# Patient Record
Sex: FEMALE | Race: WHITE | Employment: FULL TIME | URBAN - METROPOLITAN AREA
[De-identification: names, ages, dates, MRNs, and addresses within clinical notes are randomized per-mention and may not be internally consistent; named-entity substitution may affect disease eponyms.]

---

## 2019-05-10 ENCOUNTER — HOSPITAL ENCOUNTER (OUTPATIENT)
Age: 59
Discharge: HOME | End: 2019-05-10
Attending: FAMILY MEDICINE
Payer: COMMERCIAL

## 2019-05-10 VITALS
OXYGEN SATURATION: 98 % | RESPIRATION RATE: 16 BRPM | TEMPERATURE: 98 F | SYSTOLIC BLOOD PRESSURE: 139 MMHG | WEIGHT: 148 LBS | DIASTOLIC BLOOD PRESSURE: 92 MMHG | HEART RATE: 82 BPM

## 2019-05-10 DIAGNOSIS — J01.90 ACUTE SINUSITIS, RECURRENCE NOT SPECIFIED, UNSPECIFIED LOCATION: Primary | ICD-10-CM

## 2019-05-10 PROCEDURE — 99204 OFFICE O/P NEW MOD 45 MIN: CPT

## 2019-05-10 PROCEDURE — 99203 OFFICE O/P NEW LOW 30 MIN: CPT

## 2019-05-10 RX ORDER — PREDNISONE 20 MG/1
40 TABLET ORAL DAILY
Qty: 10 TABLET | Refills: 0 | Status: SHIPPED | OUTPATIENT
Start: 2019-05-10 | End: 2019-05-15

## 2019-05-10 RX ORDER — BUPROPION HYDROCHLORIDE 100 MG/1
100 TABLET ORAL 2 TIMES DAILY
COMMUNITY

## 2019-05-10 RX ORDER — AMOXICILLIN AND CLAVULANATE POTASSIUM 875; 125 MG/1; MG/1
1 TABLET, FILM COATED ORAL 2 TIMES DAILY
Qty: 20 TABLET | Refills: 0 | Status: SHIPPED | OUTPATIENT
Start: 2019-05-10 | End: 2019-05-20

## 2019-05-10 RX ORDER — ALPRAZOLAM 0.25 MG/1
0.25 TABLET ORAL NIGHTLY PRN
COMMUNITY

## 2019-05-10 RX ORDER — LEVOTHYROXINE SODIUM 0.07 MG/1
75 TABLET ORAL
COMMUNITY

## 2019-05-11 NOTE — ED INITIAL ASSESSMENT (HPI)
Patient c/o intermittent post nasal drip for 2 months. Worse this week. Pain behind eyes, headache, and coughing up green sputum for 1 week.

## 2019-05-11 NOTE — ED PROVIDER NOTES
Patient presents with:  Sinusitis  Post Nasal Drip  Headache  Cough    HPI:     Eleno Serrano is a 61year old female who presents with a chief complaint of sinus pain/pressure, facial pain, frontal headache, nasal congestion, thick colored nasal drainag moderate frontal sinus tenderness bilateral  Throat: lips, mucosa, and tongue normal; teeth and gums normal  Neck: no adenopathy and supple, symmetrical, trachea midline  Back: symmetric, no curvature. ROM normal. No CVA tenderness.   Lungs: clear to auscul

## 2019-05-23 ENCOUNTER — APPOINTMENT (OUTPATIENT)
Dept: DERMATOLOGY | Age: 59
End: 2019-05-23

## 2019-06-04 ENCOUNTER — OFFICE VISIT (OUTPATIENT)
Dept: DERMATOLOGY | Age: 59
End: 2019-06-04

## 2019-06-04 DIAGNOSIS — L71.9 ROSACEA: Primary | ICD-10-CM

## 2019-06-04 DIAGNOSIS — L30.9 DERMATITIS: ICD-10-CM

## 2019-06-04 PROCEDURE — 99202 OFFICE O/P NEW SF 15 MIN: CPT | Performed by: DERMATOLOGY

## 2019-06-04 RX ORDER — ALPRAZOLAM 0.25 MG/1
0.25 TABLET ORAL PRN
COMMUNITY
Start: 2018-02-12

## 2019-06-04 RX ORDER — LEVOTHYROXINE SODIUM 0.07 MG/1
75 TABLET ORAL
COMMUNITY
Start: 2018-03-08

## 2019-06-04 RX ORDER — TRIAMCINOLONE ACETONIDE 1 MG/G
OINTMENT TOPICAL
Qty: 80 G | Refills: 1 | Status: SHIPPED | OUTPATIENT
Start: 2019-06-04

## 2019-06-04 RX ORDER — BETAMETHASONE DIPROPIONATE 0.05 %
OINTMENT (GRAM) TOPICAL PRN
COMMUNITY
Start: 2018-07-26 | End: 2019-06-04 | Stop reason: ALTCHOICE

## 2019-06-04 RX ORDER — BUPROPION HYDROCHLORIDE 75 MG/1
75 TABLET ORAL
COMMUNITY
Start: 2018-11-05

## 2019-07-21 ENCOUNTER — WALK IN (OUTPATIENT)
Dept: URGENT CARE | Age: 59
End: 2019-07-21

## 2019-07-21 VITALS
OXYGEN SATURATION: 98 % | HEART RATE: 87 BPM | RESPIRATION RATE: 18 BRPM | SYSTOLIC BLOOD PRESSURE: 110 MMHG | DIASTOLIC BLOOD PRESSURE: 80 MMHG | TEMPERATURE: 98 F

## 2019-07-21 DIAGNOSIS — N30.01 ACUTE CYSTITIS WITH HEMATURIA: Primary | ICD-10-CM

## 2019-07-21 DIAGNOSIS — R39.9 UTI SYMPTOMS: ICD-10-CM

## 2019-07-21 LAB
APPEARANCE, POC: CLEAR
BILIRUBIN, POC: NEGATIVE
COLOR, POC: YELLOW
GLUCOSE UR-MCNC: NEGATIVE MG/DL
KETONES, POC: NEGATIVE
NITRITE, POC: NEGATIVE
OCCULT BLOOD, POC: ABNORMAL
PH UR: 6 [PH] (ref 5–7)
PROT UR-MCNC: NEGATIVE G/DL
SP GR UR: 1.01 (ref 1–1.03)
UROBILINOGEN UR-MCNC: 0.2 MG/DL (ref 0–1)
WBC (LEUKOCYTE) ESTERASE, POC: ABNORMAL

## 2019-07-21 PROCEDURE — 99203 OFFICE O/P NEW LOW 30 MIN: CPT | Performed by: NURSE PRACTITIONER

## 2019-07-21 PROCEDURE — 81002 URINALYSIS NONAUTO W/O SCOPE: CPT | Performed by: NURSE PRACTITIONER

## 2019-07-21 RX ORDER — PHENAZOPYRIDINE HYDROCHLORIDE 200 MG/1
200 TABLET, FILM COATED ORAL 3 TIMES DAILY PRN
Qty: 6 TABLET | Refills: 0 | Status: SHIPPED | OUTPATIENT
Start: 2019-07-21

## 2019-07-21 RX ORDER — NITROFURANTOIN 25; 75 MG/1; MG/1
100 CAPSULE ORAL 2 TIMES DAILY
Qty: 10 CAPSULE | Refills: 0 | Status: SHIPPED | OUTPATIENT
Start: 2019-07-21

## 2019-07-21 ASSESSMENT — ENCOUNTER SYMPTOMS
CONSTITUTIONAL NEGATIVE: 1
RESPIRATORY NEGATIVE: 1
GASTROINTESTINAL NEGATIVE: 1

## 2022-07-06 ENCOUNTER — HOSPITAL ENCOUNTER (OUTPATIENT)
Age: 62
Discharge: HOME | End: 2022-07-06
Payer: COMMERCIAL

## 2022-07-06 VITALS
OXYGEN SATURATION: 99 % | RESPIRATION RATE: 18 BRPM | WEIGHT: 138 LBS | HEIGHT: 67 IN | BODY MASS INDEX: 21.66 KG/M2 | DIASTOLIC BLOOD PRESSURE: 89 MMHG | HEART RATE: 79 BPM | TEMPERATURE: 98 F | SYSTOLIC BLOOD PRESSURE: 134 MMHG

## 2022-07-06 DIAGNOSIS — H02.89 IRRITATION OF EYELID: Primary | ICD-10-CM

## 2022-07-06 PROCEDURE — 99203 OFFICE O/P NEW LOW 30 MIN: CPT | Performed by: PHYSICIAN ASSISTANT

## 2022-07-06 RX ORDER — OFLOXACIN 3 MG/ML
2 SOLUTION/ DROPS OPHTHALMIC 4 TIMES DAILY
Qty: 5 ML | Refills: 0 | Status: SHIPPED | OUTPATIENT
Start: 2022-07-06

## 2022-09-26 ENCOUNTER — OFFICE VISIT (OUTPATIENT)
Dept: DERMATOLOGY | Age: 62
End: 2022-09-26

## 2022-09-26 DIAGNOSIS — L57.0 ACTINIC KERATOSES: Primary | ICD-10-CM

## 2022-09-26 PROCEDURE — 99203 OFFICE O/P NEW LOW 30 MIN: CPT | Performed by: DERMATOLOGY

## 2022-09-26 RX ORDER — FLUOROURACIL 50 MG/G
CREAM TOPICAL
Qty: 40 G | Refills: 2 | Status: SHIPPED | OUTPATIENT
Start: 2022-09-26

## 2022-09-26 RX ORDER — FLUOROURACIL 50 MG/G
CREAM TOPICAL
Qty: 40 G | Refills: 2 | Status: SHIPPED | OUTPATIENT
Start: 2022-09-26 | End: 2022-09-26 | Stop reason: SDUPTHER

## 2022-12-01 ENCOUNTER — APPOINTMENT (OUTPATIENT)
Dept: GENERAL RADIOLOGY | Age: 62
End: 2022-12-01
Attending: NURSE PRACTITIONER
Payer: COMMERCIAL

## 2022-12-01 ENCOUNTER — HOSPITAL ENCOUNTER (OUTPATIENT)
Age: 62
Discharge: HOME | End: 2022-12-01
Payer: COMMERCIAL

## 2022-12-01 ENCOUNTER — APPOINTMENT (OUTPATIENT)
Dept: CT IMAGING | Age: 62
End: 2022-12-01
Attending: NURSE PRACTITIONER
Payer: COMMERCIAL

## 2022-12-01 VITALS
RESPIRATION RATE: 18 BRPM | HEART RATE: 90 BPM | SYSTOLIC BLOOD PRESSURE: 140 MMHG | WEIGHT: 136 LBS | TEMPERATURE: 98 F | BODY MASS INDEX: 21.35 KG/M2 | HEIGHT: 67 IN | OXYGEN SATURATION: 96 % | DIASTOLIC BLOOD PRESSURE: 78 MMHG
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
## 2022-12-01 DIAGNOSIS — S00.31XA ABRASION, NOSE W/O INFECTION: ICD-10-CM

## 2022-12-01 DIAGNOSIS — S00.83XA CONTUSION OF FACE, INITIAL ENCOUNTER: Primary | ICD-10-CM

## 2022-12-01 PROCEDURE — 73030 X-RAY EXAM OF SHOULDER: CPT | Performed by: NURSE PRACTITIONER

## 2022-12-01 PROCEDURE — 70486 CT MAXILLOFACIAL W/O DYE: CPT | Performed by: NURSE PRACTITIONER

## 2022-12-01 PROCEDURE — 76376 3D RENDER W/INTRP POSTPROCES: CPT | Performed by: NURSE PRACTITIONER

## 2022-12-01 PROCEDURE — 72125 CT NECK SPINE W/O DYE: CPT | Performed by: NURSE PRACTITIONER

## 2022-12-01 PROCEDURE — 73560 X-RAY EXAM OF KNEE 1 OR 2: CPT | Performed by: NURSE PRACTITIONER

## 2022-12-01 PROCEDURE — 99213 OFFICE O/P EST LOW 20 MIN: CPT | Performed by: PHYSICIAN ASSISTANT

## 2022-12-01 PROCEDURE — 70450 CT HEAD/BRAIN W/O DYE: CPT | Performed by: NURSE PRACTITIONER

## 2022-12-01 PROCEDURE — 99203 OFFICE O/P NEW LOW 30 MIN: CPT | Performed by: NURSE PRACTITIONER

## 2022-12-01 RX ORDER — HYDROCODONE BITARTRATE AND ACETAMINOPHEN 5; 325 MG/1; MG/1
1 TABLET ORAL NIGHTLY
Qty: 5 TABLET | Refills: 0 | Status: SHIPPED | OUTPATIENT
Start: 2022-12-01 | End: 2022-12-06

## 2022-12-01 RX ORDER — HYDROCODONE BITARTRATE AND ACETAMINOPHEN 5; 325 MG/1; MG/1
1 TABLET ORAL ONCE
Status: COMPLETED | OUTPATIENT
Start: 2022-12-01 | End: 2022-12-01

## 2022-12-08 ENCOUNTER — APPOINTMENT (OUTPATIENT)
Dept: DERMATOLOGY | Age: 62
End: 2022-12-08

## 2023-02-09 ENCOUNTER — APPOINTMENT (OUTPATIENT)
Dept: DERMATOLOGY | Age: 63
End: 2023-02-09

## 2023-02-16 ENCOUNTER — APPOINTMENT (OUTPATIENT)
Dept: DERMATOLOGY | Age: 63
End: 2023-02-16

## 2023-06-29 ENCOUNTER — OFFICE VISIT (OUTPATIENT)
Dept: DERMATOLOGY | Age: 63
End: 2023-06-29

## 2023-06-29 DIAGNOSIS — L30.9 HAND DERMATITIS: ICD-10-CM

## 2023-06-29 DIAGNOSIS — L71.9 ROSACEA: Primary | ICD-10-CM

## 2023-06-29 PROCEDURE — 99213 OFFICE O/P EST LOW 20 MIN: CPT | Performed by: DERMATOLOGY

## 2023-06-29 RX ORDER — HALOBETASOL PROPIONATE 0.05 %
OINTMENT (GRAM) TOPICAL
Qty: 50 G | Refills: 1 | Status: SHIPPED | OUTPATIENT
Start: 2023-06-29 | End: 2023-10-17 | Stop reason: SDUPTHER

## 2023-08-15 ENCOUNTER — APPOINTMENT (OUTPATIENT)
Dept: DERMATOLOGY | Age: 63
End: 2023-08-15

## 2023-09-19 ENCOUNTER — APPOINTMENT (OUTPATIENT)
Dept: DERMATOLOGY | Age: 63
End: 2023-09-19

## 2023-10-17 ENCOUNTER — OFFICE VISIT (OUTPATIENT)
Dept: DERMATOLOGY | Age: 63
End: 2023-10-17

## 2023-10-17 DIAGNOSIS — L71.9 ROSACEA: Primary | ICD-10-CM

## 2023-10-17 PROCEDURE — X17110: Performed by: DERMATOLOGY

## 2023-10-17 RX ORDER — HALOBETASOL PROPIONATE 0.05 %
OINTMENT (GRAM) TOPICAL
Qty: 50 G | Refills: 1 | Status: SHIPPED | OUTPATIENT
Start: 2023-10-17

## 2023-10-19 ENCOUNTER — TELEPHONE (OUTPATIENT)
Dept: DERMATOLOGY | Age: 63
End: 2023-10-19

## 2024-03-05 ENCOUNTER — APPOINTMENT (OUTPATIENT)
Dept: PODIATRY | Age: 64
End: 2024-03-05

## 2024-03-05 DIAGNOSIS — B35.1 FUNGAL NAIL INFECTION: ICD-10-CM

## 2024-03-05 DIAGNOSIS — G57.61 PLANTAR NEUROMA OF RIGHT FOOT: Primary | ICD-10-CM

## 2024-03-05 DIAGNOSIS — L60.0 INGROWN NAIL: ICD-10-CM

## 2024-04-18 ENCOUNTER — APPOINTMENT (OUTPATIENT)
Dept: DERMATOLOGY | Age: 64
End: 2024-04-18

## 2024-04-18 DIAGNOSIS — L57.0 AK (ACTINIC KERATOSIS): ICD-10-CM

## 2024-04-18 DIAGNOSIS — L71.9 ROSACEA: Primary | ICD-10-CM

## 2024-04-18 PROCEDURE — 99213 OFFICE O/P EST LOW 20 MIN: CPT | Performed by: DERMATOLOGY

## 2024-04-18 RX ORDER — CEFUROXIME AXETIL 500 MG/1
500 TABLET ORAL 2 TIMES DAILY
Qty: 60 TABLET | Refills: 1 | Status: SHIPPED | OUTPATIENT
Start: 2024-04-18

## 2024-04-18 RX ORDER — FLUOROURACIL 50 MG/G
CREAM TOPICAL
Qty: 40 G | Refills: 0 | Status: SHIPPED | OUTPATIENT
Start: 2024-04-18

## 2024-04-25 ENCOUNTER — APPOINTMENT (OUTPATIENT)
Dept: PODIATRY | Age: 64
End: 2024-04-25

## 2025-06-20 ENCOUNTER — HOSPITAL ENCOUNTER (OUTPATIENT)
Age: 65
Discharge: HOME | End: 2025-06-20
Payer: MEDICARE

## 2025-06-20 VITALS
SYSTOLIC BLOOD PRESSURE: 109 MMHG | TEMPERATURE: 98 F | OXYGEN SATURATION: 98 % | DIASTOLIC BLOOD PRESSURE: 59 MMHG | HEART RATE: 69 BPM | RESPIRATION RATE: 16 BRPM

## 2025-06-20 DIAGNOSIS — R39.9 URINARY TRACT INFECTION SYMPTOMS: Primary | ICD-10-CM

## 2025-06-20 DIAGNOSIS — N30.90 CYSTITIS: ICD-10-CM

## 2025-06-20 LAB
BILIRUB UR QL STRIP: NEGATIVE
CLARITY UR: CLEAR
COLOR UR: YELLOW
GLUCOSE UR STRIP-MCNC: NEGATIVE MG/DL
KETONES UR STRIP-MCNC: NEGATIVE MG/DL
NITRITE UR QL STRIP: NEGATIVE
PH UR STRIP: 7 [PH]
PROT UR STRIP-MCNC: NEGATIVE MG/DL
SP GR UR STRIP: 1.01
UROBILINOGEN UR STRIP-ACNC: <2 MG/DL

## 2025-06-20 PROCEDURE — 81002 URINALYSIS NONAUTO W/O SCOPE: CPT | Performed by: NURSE PRACTITIONER

## 2025-06-20 PROCEDURE — 99214 OFFICE O/P EST MOD 30 MIN: CPT | Performed by: NURSE PRACTITIONER

## 2025-06-20 RX ORDER — NITROFURANTOIN 25; 75 MG/1; MG/1
100 CAPSULE ORAL 2 TIMES DAILY
Qty: 10 CAPSULE | Refills: 0 | Status: SHIPPED | OUTPATIENT
Start: 2025-06-20 | End: 2025-06-25

## 2025-06-20 NOTE — DISCHARGE INSTRUCTIONS
VISIT SUMMARY:  Today, you came in because you were experiencing pressure and discomfort during urination, which started this morning. You have a history of urinary tract infections, but you mentioned that this episode feels less severe than previous ones. You also discussed your current medications and a recent dietary change.    YOUR PLAN:  -URINARY TRACT INFECTION: You have a urinary tract infection (UTI), which is an infection in any part of your urinary system. We will send a urine sample for culture to identify the specific bacteria causing the infection. In the meantime, you will start on an antibiotic to treat the infection. We may adjust the antibiotic based on the culture results.    -DEPRESSION: Your depression is being managed with your current antidepressant medication. Please continue taking your medication as prescribed.    -HYPOTHYROIDISM: Your hypothyroidism, a condition where your thyroid does not produce enough hormones, is being managed with levothyroxine. Please continue taking your medication as prescribed.    INSTRUCTIONS:  Please follow up with us after your urine culture results are available so we can adjust your antibiotic if necessary. Continue taking your antidepressant and levothyroxine as prescribed. If you experience any new symptoms or if your current symptoms worsen, contact us immediately.    Contains text generated by Cindy     denies drinking, smoking and drug abuse

## 2025-06-20 NOTE — ED PROVIDER NOTES
Patient Seen in: [facility]       The following individual(s) verbally consented to be recorded using ambient AI listening technology and understand that they can each withdraw their consent to this listening technology at any point by asking the clinician to turn off or pause the recording:    Patient name: Mima Rain        History  Chief Complaint   Patient presents with    Urinary Symptoms     Stated Complaint: Pressure when urinating    Subjective:   HPI     Mima Rain is a 65 year old female who presents with urinary pressure and discomfort.    She experiences pressure during urination, which began this morning. The pressure is also present slightly before urination but intensifies during urination. No fever, chills, or flank pain.    She has a history of urinary tract infections, with previous episodes being severe and sometimes resulting in hematuria. However, she states that the current episode is less severe.    She mentions a recent dietary change, having consumed something rich on Monday, which resulted in a severe bowel movement. She does not believe this is related to her urinary symptoms, which began today.    She is currently taking an antidepressant, which she may have missed this morning, and levothyroxine. She reports no known medication allergies but is unsure if she may have an allergy to an unspecified antibiotic.        Objective:     No pertinent past medical history.            No pertinent past surgical history.              No pertinent social history.            Review of Systems   Constitutional: Negative.    HENT: Negative.     Eyes: Negative.    Respiratory: Negative.     Cardiovascular: Negative.    Gastrointestinal: Negative.    Endocrine: Negative.    Genitourinary:  Positive for dysuria. Negative for flank pain.   Musculoskeletal: Negative.    Skin: Negative.    Allergic/Immunologic: Negative.    Neurological: Negative.    Hematological: Negative.     Psychiatric/Behavioral: Negative.         Positive for stated complaint: Pressure when urinating  Other systems are as noted in HPI.  Constitutional and vital signs reviewed.      All other systems reviewed and negative except as noted above.                  Physical Exam    ED Triage Vitals [06/20/25 0832]   /59   Pulse 69   Resp 16   Temp 98.4 °F (36.9 °C)   Temp src Oral   SpO2 98 %   O2 Device None (Room air)       Current Vitals:   Vital Signs  BP: 109/59  Pulse: 69  Resp: 16  Temp: 98.4 °F (36.9 °C)  Temp src: Oral    Oxygen Therapy  SpO2: 98 %  O2 Device: None (Room air)            Physical Exam  Vitals and nursing note reviewed.   Constitutional:       Appearance: Normal appearance. She is normal weight.   HENT:      Head: Normocephalic.      Right Ear: External ear normal.      Left Ear: External ear normal.   Eyes:      Extraocular Movements: Extraocular movements intact.      Conjunctiva/sclera: Conjunctivae normal.      Pupils: Pupils are equal, round, and reactive to light.   Pulmonary:      Effort: Pulmonary effort is normal.   Abdominal:      Tenderness: There is no right CVA tenderness or left CVA tenderness.   Neurological:      Mental Status: She is alert.   Psychiatric:         Mood and Affect: Mood normal.               ED Course  Labs Reviewed   [facility] POCT URINALYSIS DIPSTICK - Abnormal; Notable for the following components:       Result Value    Blood, Urine Trace-Intact (*)     Leukocyte esterase urine Trace (*)     All other components within normal limits   URINE CULTURE, ROUTINE                            MDM     Mima Rain is a 65 year old female who presents with urinary pressure and discomfort.    She experiences pressure during urination, which began this morning. The pressure is also present slightly before urination but intensifies during urination. No fever, chills, or flank pain.    She has a history of urinary tract infections, with previous episodes being severe and  sometimes resulting in hematuria. However, she states that the current episode is less severe.    She mentions a recent dietary change, having consumed something rich on Monday, which resulted in a severe bowel movement. She does not believe this is related to her urinary symptoms, which began today.    She is currently taking an antidepressant, which she may have missed this morning, and levothyroxine. She reports no known medication allergies but is unsure if she may have an allergy to an unspecified antibiotic.  Vital signs: Please see EMR.  Physical exam: Please see exam.  Differential diagnosis: Cystitis, dysuria, UTI.  Recent Results (from the past 24 hours)   POCT Urinalysis Dipstick    Collection Time: 06/20/25  8:44 AM   Result Value Ref Range    Urine Color Yellow Yellow    Urine Clarity Clear Clear    Specific Gravity, Urine 1.010 1.005 - 1.030    PH, Urine 7.0 5.0 - 8.0    Protein urine Negative Negative mg/dL    Glucose, Urine Negative Negative mg/dL    Ketone, Urine Negative Negative mg/dL    Bilirubin, Urine Negative Negative    Blood, Urine Trace-Intact (A) Negative    Nitrite Urine Negative Negative    Urobilinogen urine <2.0 <2.0 mg/dL    Leukocyte esterase urine Trace (A) Negative     Assessment & Plan  Urinary tract infection  Acute UTI with dysuria, bacteriuria, and pyuria. Likely cystitis due to E. Coli.  - Send urine sample for culture.  - Prescribe antibiotic for cystitis.  - Adjust antibiotic based on culture results.    Depression  Depression managed with antidepressants.    Hypothyroidism  Hypothyroidism managed with levothyroxine.        Based on physical exam and HPI will diagnosed with cystitis and treat with Macrobid twice daily for 5 days.  Will notify patient if there are any abnormalities with the urine culture that indicates the need to change treatment plan.        Medical Decision Making  Mima Rain is a 65 year old female who presents with urinary pressure and  discomfort.    She experiences pressure during urination, which began this morning. The pressure is also present slightly before urination but intensifies during urination. No fever, chills, or flank pain.    She has a history of urinary tract infections, with previous episodes being severe and sometimes resulting in hematuria. However, she states that the current episode is less severe.    She mentions a recent dietary change, having consumed something rich on Monday, which resulted in a severe bowel movement. She does not believe this is related to her urinary symptoms, which began today.    She is currently taking an antidepressant, which she may have missed this morning, and levothyroxine. She reports no known medication allergies but is unsure if she may have an allergy to an unspecified antibiotic.    Problems Addressed:  Cystitis: acute illness or injury  Urinary tract infection symptoms: acute illness or injury    Amount and/or Complexity of Data Reviewed  Labs: ordered.     Details: Recent Results (from the past 24 hours)  -POCT Urinalysis Dipstick:   Collection Time: 06/20/25  8:44 AM       Result                      Value             Ref Range           Urine Color                 Yellow            Yellow              Urine Clarity               Clear             Clear               Specific Gravity, Urine     1.010             1.005 - 1.030       PH, Urine                   7.0               5.0 - 8.0           Protein urine               Negative          Negative mg/*       Glucose, Urine              Negative          Negative mg/*       Ketone, Urine               Negative          Negative mg/*       Bilirubin, Urine            Negative          Negative            Blood, Urine                                  Negative        Trace-Intact (A)       Nitrite Urine               Negative          Negative            Urobilinogen urine          <2.0              <2.0 mg/dL          Leukocyte esterase  uri*     Trace (A)         Negative           Risk  Prescription drug management.        Disposition and Plan     Clinical Impression:  1. Urinary tract infection symptoms    2. Cystitis         Disposition:  Discharge  6/20/2025  8:54 am    Follow-up:  Maicol Bailey [address] [address] [phone]      As needed          Medications Prescribed:  Current Discharge Medication List        START taking these medications    Details   nitrofurantoin monohydrate macro 100 MG Oral Cap Take 1 capsule (100 mg total) by mouth 2 (two) times daily for 5 days.  Qty: 10 capsule, Refills: 0                   Supplementary Documentation:

## 2025-06-20 NOTE — ED INITIAL ASSESSMENT (HPI)
Patient started with urinary symptoms this morning. She did have a loose BM after eating something that didn't agree with her on Monday and is concerned that may have cause this issue.

## 2025-06-22 NOTE — ED NOTES
Left message to call for lab result.    (Urine culture negative, stop antibiotic, f/u with PMD)

## 2025-06-22 NOTE — ED NOTES
Pt returned call. Informed of urine culture result and to stop the antibiotic, follow up with PMD if symptoms persist. Verbalized understanding.